# Patient Record
(demographics unavailable — no encounter records)

---

## 2020-03-25 NOTE — ULT
RIGHT UPPER QUADRANT ULTRASOUND:

 

Date:  03/25/2020

 

HISTORY:  

Chest pain and gastroesophageal reflux disease. 

 

COMPARISON:  

None. 

 

FINDINGS:

The liver, visualized portions of the pancreas, visualized portions of the abdominal aorta, visualize
d portions of the IVC, gallbladder, and right kidney demonstrate a normal sonographic appearance. The
 right kidney measures 10.4 cm in length. The common duct measures 0.3 cm in diameter. 

 

IMPRESSION: 

No acute findings are seen. No gallbladder calculi are seen, and the common duct is normal in caliber
. 

 

 

POS: EM[facility]